# Patient Record
Sex: MALE | Race: WHITE | NOT HISPANIC OR LATINO | Employment: FULL TIME | URBAN - METROPOLITAN AREA
[De-identification: names, ages, dates, MRNs, and addresses within clinical notes are randomized per-mention and may not be internally consistent; named-entity substitution may affect disease eponyms.]

---

## 2018-02-09 ENCOUNTER — APPOINTMENT (OUTPATIENT)
Dept: RADIOLOGY | Facility: MEDICAL CENTER | Age: 23
End: 2018-02-09
Attending: EMERGENCY MEDICINE
Payer: COMMERCIAL

## 2018-02-09 ENCOUNTER — HOSPITAL ENCOUNTER (EMERGENCY)
Facility: MEDICAL CENTER | Age: 23
End: 2018-02-09
Attending: EMERGENCY MEDICINE
Payer: COMMERCIAL

## 2018-02-09 VITALS
TEMPERATURE: 98.3 F | RESPIRATION RATE: 18 BRPM | HEART RATE: 82 BPM | OXYGEN SATURATION: 99 % | WEIGHT: 152.12 LBS | HEIGHT: 71 IN | BODY MASS INDEX: 21.3 KG/M2 | DIASTOLIC BLOOD PRESSURE: 78 MMHG | SYSTOLIC BLOOD PRESSURE: 120 MMHG

## 2018-02-09 DIAGNOSIS — L72.3 SCROTAL SEBACEOUS CYST: ICD-10-CM

## 2018-02-09 LAB
APPEARANCE UR: CLEAR
BACTERIA #/AREA URNS HPF: ABNORMAL /HPF
BILIRUB UR QL STRIP.AUTO: NEGATIVE
COLOR UR: YELLOW
GLUCOSE UR STRIP.AUTO-MCNC: NEGATIVE MG/DL
KETONES UR STRIP.AUTO-MCNC: NEGATIVE MG/DL
LEUKOCYTE ESTERASE UR QL STRIP.AUTO: NEGATIVE
MICRO URNS: ABNORMAL
NITRITE UR QL STRIP.AUTO: NEGATIVE
PH UR STRIP.AUTO: 6 [PH]
PROT UR QL STRIP: NEGATIVE MG/DL
RBC # URNS HPF: ABNORMAL /HPF
RBC UR QL AUTO: ABNORMAL
SP GR UR STRIP.AUTO: 1.02
WBC #/AREA URNS HPF: ABNORMAL /HPF

## 2018-02-09 PROCEDURE — 81001 URINALYSIS AUTO W/SCOPE: CPT

## 2018-02-09 PROCEDURE — 76870 US EXAM SCROTUM: CPT

## 2018-02-09 PROCEDURE — 87491 CHLMYD TRACH DNA AMP PROBE: CPT

## 2018-02-09 PROCEDURE — 99284 EMERGENCY DEPT VISIT MOD MDM: CPT

## 2018-02-09 PROCEDURE — 87591 N.GONORRHOEAE DNA AMP PROB: CPT

## 2018-02-09 PROCEDURE — 87086 URINE CULTURE/COLONY COUNT: CPT

## 2018-02-09 ASSESSMENT — PAIN SCALES - GENERAL: PAINLEVEL_OUTOF10: 6

## 2018-02-09 NOTE — ED PROVIDER NOTES
"ED Provider Note    CHIEF COMPLAINT  As above    HPI  Дмитрий Alarcon is a 22 y.o. male who presents with a painful nodule on the scrotum. He reports he has had a nodule for quite a while, but over the last 2 days it's been causing him more discomfort and seems bigger than it has been in the past. Dull pain worse when he touches the area. No fevers or chills. No drainage. He's never seen a doctor for this nodule in the past. Denies dysuria hematuria or frequency.    REVIEW OF SYSTEMS  See HPI for further details. All other systems are negative.     PAST MEDICAL HISTORY  Denies medical problems    FAMILY HISTORY  History reviewed. No pertinent family history.    SOCIAL HISTORY  Social History   Substance Use Topics   • Smoking status: Current Every Day Smoker     Packs/day: 0.50     Types: Cigarettes   • Smokeless tobacco: Never Used   • Alcohol use Yes      Comment: Frequently       SURGICAL HISTORY  History reviewed. No pertinent surgical history.    CURRENT MEDICATIONS  Home Medications     Reviewed by Pete Joseph (Pharmacy Tech) on 02/09/18 at 1056  Med List Status: Complete   Medication Last Dose Status        Patient Goyo Taking any Medications                       ALLERGIES  No Known Allergies    PHYSICAL EXAM  VITAL SIGNS: /83   Pulse (!) 110   Temp 36.8 °C (98.3 °F)   Resp 18   Ht 1.803 m (5' 11\") Comment: Stated  Wt 69 kg (152 lb 1.9 oz)   SpO2 99%   BMI 21.22 kg/m²   Constitutional : Nontoxic  HENT: Atraumatic head  Eyes: Grossly normal  Neck: Grossly normal  Lymphatic: No lymphadenopathy noted.   Cardiovascular: Normal heart rate at 90  Thorax & Lungs: Normal breath sounds, No respiratory distress  Abdomen: Bowel sounds normal, Soft, No tenderness, No masses, No pulsatile masses, no hernias noted  Genitalia: Circumcised male. No discharge at the meatus. There is an obvious skin lesion over the right cephalad anterior scrotum this is about 1 cm in diameter bulbous ballotable " apparent scrotal lesion. Just inferior to this there is a smaller similarly appearing skin lesion. There is no surrounding erythema. He does have small scars from apparent previous pustules. The testicles themselves are nontender without swelling. Normal cremaster reflex.  Skin: Scrotal exam as above  Extremities: Normal inspection   Psychiatric: Affect normal        RADIOLOGY/PROCEDURES  [id number]   Final Result      2 cm mass at base of penis is distinct from the normal testes. Tissue diagnosis is recommended. Malignancy is a concern.        Imaging is interpreted by radiologist and reviewed by me    Labs:  Results for orders placed or performed during the hospital encounter of 02/09/18   URINALYSIS   Result Value Ref Range    Color Yellow     Character Clear     Specific Gravity 1.020 <1.035    Ph 6.0 5.0 - 8.0    Glucose Negative Negative mg/dL    Ketones Negative Negative mg/dL    Protein Negative Negative mg/dL    Bilirubin Negative Negative    Nitrite Negative Negative    Leukocyte Esterase Negative Negative    Occult Blood Trace (A) Negative    Micro Urine Req Microscopic    CHLAMYDIA & GC BY PCR   Result Value Ref Range    Source Urine    URINE MICROSCOPIC (W/UA)   Result Value Ref Range    WBC Rare (A) /hpf    RBC 0-2 (A) /hpf    Bacteria Rare (A) None /hpf       COURSE & MEDICAL DECISION MAKING  History presents to the ER with a scrotal mass. Appears most consistent with a sebaceous cyst. There is no evidence of infection. I did obtain an ultrasound showed that this scrotal mass was indeed distinct from the testicle. He certainly needs to follow-up with neurology to have this excised. I have referred him to Dr. green who is on call. I've asked him to call today to make an appointment as soon as possible. He should return to the ER if he has any fevers, tracking redness, significant pain or concern.    Patient referred to primary provider for blood pressure management    FINAL IMPRESSION  1.  Scrotal mass, probable sebaceous cyst    This dictation was created using voice recognition software. The accuracy of the dictation is limited to the abilities of the software. I expect there may be some errors of grammar and possibly content. The nursing notes were reviewed and certain aspects of this information were incorporated into this note.      Electronically signed by: Roger Schwartz, 2/9/2018 10:57 AM

## 2018-02-09 NOTE — ED NOTES
ERP at bedside. Pt agrees with plan of care discussed by ERP. AIDET acknowledged with patient. Lizbeth in low position, side rail up for pt safety. Call light within reach. Will continue to monitor.

## 2018-02-09 NOTE — DISCHARGE INSTRUCTIONS
Epidermal Cyst  An epidermal cyst is usually a small, painless lump under the skin. Cysts often occur on the face, neck, stomach, chest, or genitals. The cyst may be filled with a bad smelling paste. Do not pop your cyst. Popping the cyst can cause pain and puffiness (swelling).  HOME CARE   · Only take medicines as told by your doctor.  · Take your medicine (antibiotics) as told. Finish it even if you start to feel better.  GET HELP RIGHT AWAY IF:  · Your cyst is tender, red, or puffy.  · You are not getting better, or you are getting worse.  · You have any questions or concerns.  MAKE SURE YOU:  · Understand these instructions.  · Will watch your condition.  · Will get help right away if you are not doing well or get worse.     This information is not intended to replace advice given to you by your health care provider. Make sure you discuss any questions you have with your health care provider.     Document Released: 01/25/2006 Document Revised: 06/18/2013 Document Reviewed: 06/25/2012  [employer] Interactive Patient Education ©2016 [facility]

## 2018-02-10 ENCOUNTER — HOSPITAL ENCOUNTER (EMERGENCY)
Facility: MEDICAL CENTER | Age: 23
End: 2018-02-10
Attending: EMERGENCY MEDICINE
Payer: COMMERCIAL

## 2018-02-10 VITALS
RESPIRATION RATE: 16 BRPM | WEIGHT: 151.68 LBS | HEART RATE: 108 BPM | TEMPERATURE: 98 F | BODY MASS INDEX: 21.23 KG/M2 | SYSTOLIC BLOOD PRESSURE: 124 MMHG | OXYGEN SATURATION: 95 % | HEIGHT: 71 IN | DIASTOLIC BLOOD PRESSURE: 74 MMHG

## 2018-02-10 DIAGNOSIS — N49.2 SCROTAL WALL ABSCESS: ICD-10-CM

## 2018-02-10 LAB
C TRACH DNA SPEC QL NAA+PROBE: NEGATIVE
N GONORRHOEA DNA SPEC QL NAA+PROBE: NEGATIVE
SPECIMEN SOURCE: NORMAL

## 2018-02-10 PROCEDURE — 303977 HCHG I & D

## 2018-02-10 PROCEDURE — 99283 EMERGENCY DEPT VISIT LOW MDM: CPT

## 2018-02-10 RX ORDER — CEPHALEXIN 500 MG/1
500 TABLET ORAL 4 TIMES DAILY
Qty: 40 TAB | Refills: 0 | Status: SHIPPED | OUTPATIENT
Start: 2018-02-10

## 2018-02-10 RX ORDER — SULFAMETHOXAZOLE AND TRIMETHOPRIM 800; 160 MG/1; MG/1
1 TABLET ORAL 2 TIMES DAILY
Qty: 20 TAB | Refills: 0 | Status: SHIPPED | OUTPATIENT
Start: 2018-02-10 | End: 2018-02-20

## 2018-02-10 ASSESSMENT — PAIN SCALES - GENERAL
PAINLEVEL_OUTOF10: 0
PAINLEVEL_OUTOF10: 10

## 2018-02-10 NOTE — ED TRIAGE NOTES
Amb to triage w/ c/o R testicle pain.  Pt was seen at [facility] yesterday and diagnosed w/ a scrotal sebaceous cyst, scheduled for surgery 3/1.  Pt reports that cyst ruptures 20 min ago, reports + blood and purulent drainage.

## 2018-02-10 NOTE — DISCHARGE INSTRUCTIONS
Use Tylenol and Motrin for pain, complete the antibiotics as prescribed. Keep your follow-up appointment with the urologist. Return here if you develop new or worsening symptoms.

## 2018-02-10 NOTE — ED NOTES
Patient given discharge instructions, prescriptions x 2, follow up recommendations, and return precautions. All questions answered.

## 2018-02-11 LAB
BACTERIA UR CULT: NORMAL
SIGNIFICANT IND 70042: NORMAL
SITE SITE: NORMAL
SOURCE SOURCE: NORMAL

## 2018-02-11 NOTE — ED PROVIDER NOTES
"ED Provider Note    CHIEF COMPLAINT  Chief Complaint   Patient presents with   • Testicle Pain   • Cyst       HPI  Дмитрий Alarcon is a 22 y.o. male who presents to the emergency department complaining of a cyst on the scrotum. The patient has had a lump on the scrotum for at least 2 years now initially it was not really painful but over the last for 5 days it has become uncomfortable he was seen yesterday at [facility] emergency department and had an ultrasound and thought to have a sebaceous cyst of the scrotum and the patient was directed to follow-up with urology and the patient has an appointment with urology on March 1. Last night the mass got bigger and more painful and during the night it spontaneously ruptured and drained a large amount of pus.    REVIEW OF SYSTEMS no fever or chills no abdominal pain no nausea vomiting. All other systems negative    PAST MEDICAL HISTORY  No past medical history on file.    FAMILY HISTORY  No family history on file.    SOCIAL HISTORY  Social History     Social History   • Marital status: Single     Spouse name: N/A   • Number of children: N/A   • Years of education: N/A     Social History Main Topics   • Smoking status: Current Every Day Smoker     Packs/day: 0.50     Types: Cigarettes   • Smokeless tobacco: Never Used   • Alcohol use Yes      Comment: Frequently   • Drug use: No   • Sexual activity: Not on file     Other Topics Concern   • Not on file     Social History Narrative   • No narrative on file       SURGICAL HISTORY  No past surgical history on file.    CURRENT MEDICATIONS  Home Medications    **Home medications have not yet been reviewed for this encounter**         ALLERGIES  No Known Allergies    PHYSICAL EXAM  VITAL SIGNS: /74   Pulse (!) 108   Temp 36.7 °C (98 °F) (Temporal)   Resp 16   Ht 1.803 m (5' 11\")   Wt 68.8 kg (151 lb 10.8 oz)   SpO2 95%   BMI 21.15 kg/m²    Oxygen saturation is interpreted as adequate  Constitutional: Awake " verbal anxious but otherwise well-appearing individual in no distress  Cardiovascular: Regular minimal tachycardia  Lungs: Clear and equal no difficulty breathing  Abdomen/Back: Soft nontender nondistended. Examination of the scrotum shows that there is a 1.5 cm diameter area of swelling adjacent to a firm nodule and there is spontaneous drainage of some blood and pus it looks like an abscess that has spontaneously ruptured. The defect in the skin is about 1 cm across. I don't see any other scrotal abnormality swelling or evidence of cellulitis  Skin: Otherwise warm and dry  Musculoskeletal: No acute bony deformity  Neurologic: Awake verbal ambulatory    CHART REVIEW  I reviewed the note from [place] yesterday as summarized above    PROCEDURES  The area about the apparent abscess was locally infiltrated with lidocaine to achieve adequate anesthesia I was then able to clean out the abscess cavity and we can get no further pus out of it now the opening is 1 cm in length I don't think I need to open the area any wider.    MEDICAL DECISION MAKING and DISPOSITION  At this point in time it looks like the patient had an abscess which has ruptured and drained I'm going to place him on Flexeril and Bactrim and he is to wash the area with soap and water daily and keep it very clean he is to follow up with urology as planned and he is to return here if he is having new or worsening symptoms    IMPRESSION  1. Spontaneous rupture of scrotal abscess with history of scrotal mass    Electronically signed by: Luis Alfredo Marte, 2/10/2018 4:49 PM

## 2024-06-22 ENCOUNTER — HOSPITAL ENCOUNTER (EMERGENCY)
Facility: MEDICAL CENTER | Age: 29
End: 2024-06-22
Attending: EMERGENCY MEDICINE
Payer: COMMERCIAL

## 2024-06-22 ENCOUNTER — APPOINTMENT (OUTPATIENT)
Dept: RADIOLOGY | Facility: MEDICAL CENTER | Age: 29
End: 2024-06-22
Attending: EMERGENCY MEDICINE
Payer: COMMERCIAL

## 2024-06-22 ENCOUNTER — PHARMACY VISIT (OUTPATIENT)
Dept: PHARMACY | Facility: MEDICAL CENTER | Age: 29
End: 2024-06-22
Payer: COMMERCIAL

## 2024-06-22 VITALS
WEIGHT: 169.75 LBS | HEART RATE: 66 BPM | SYSTOLIC BLOOD PRESSURE: 140 MMHG | RESPIRATION RATE: 16 BRPM | TEMPERATURE: 98.2 F | HEIGHT: 70 IN | BODY MASS INDEX: 24.3 KG/M2 | OXYGEN SATURATION: 98 % | DIASTOLIC BLOOD PRESSURE: 74 MMHG

## 2024-06-22 DIAGNOSIS — N50.811 PAIN IN BOTH TESTICLES: ICD-10-CM

## 2024-06-22 DIAGNOSIS — N50.812 PAIN IN BOTH TESTICLES: ICD-10-CM

## 2024-06-22 DIAGNOSIS — R10.30 LOWER ABDOMINAL PAIN: ICD-10-CM

## 2024-06-22 LAB
ALBUMIN SERPL BCP-MCNC: 4.6 G/DL (ref 3.2–4.9)
ALBUMIN/GLOB SERPL: 1.5 G/DL
ALP SERPL-CCNC: 72 U/L (ref 30–99)
ALT SERPL-CCNC: 79 U/L (ref 2–50)
ANION GAP SERPL CALC-SCNC: 12 MMOL/L (ref 7–16)
APPEARANCE UR: CLEAR
AST SERPL-CCNC: 36 U/L (ref 12–45)
BASOPHILS # BLD AUTO: 0.6 % (ref 0–1.8)
BASOPHILS # BLD: 0.07 K/UL (ref 0–0.12)
BILIRUB SERPL-MCNC: 0.4 MG/DL (ref 0.1–1.5)
BILIRUB UR QL STRIP.AUTO: NEGATIVE
BUN SERPL-MCNC: 11 MG/DL (ref 8–22)
CALCIUM ALBUM COR SERPL-MCNC: 9 MG/DL (ref 8.5–10.5)
CALCIUM SERPL-MCNC: 9.5 MG/DL (ref 8.5–10.5)
CHLORIDE SERPL-SCNC: 103 MMOL/L (ref 96–112)
CO2 SERPL-SCNC: 22 MMOL/L (ref 20–33)
COLOR UR: YELLOW
CREAT SERPL-MCNC: 0.75 MG/DL (ref 0.5–1.4)
EOSINOPHIL # BLD AUTO: 0.09 K/UL (ref 0–0.51)
EOSINOPHIL NFR BLD: 0.8 % (ref 0–6.9)
ERYTHROCYTE [DISTWIDTH] IN BLOOD BY AUTOMATED COUNT: 40.1 FL (ref 35.9–50)
GFR SERPLBLD CREATININE-BSD FMLA CKD-EPI: 126 ML/MIN/1.73 M 2
GLOBULIN SER CALC-MCNC: 3 G/DL (ref 1.9–3.5)
GLUCOSE SERPL-MCNC: 94 MG/DL (ref 65–99)
GLUCOSE UR STRIP.AUTO-MCNC: NEGATIVE MG/DL
HCT VFR BLD AUTO: 48.9 % (ref 42–52)
HGB BLD-MCNC: 16.9 G/DL (ref 14–18)
IMM GRANULOCYTES # BLD AUTO: 0.14 K/UL (ref 0–0.11)
IMM GRANULOCYTES NFR BLD AUTO: 1.2 % (ref 0–0.9)
KETONES UR STRIP.AUTO-MCNC: NEGATIVE MG/DL
LACTATE SERPL-SCNC: 0.9 MMOL/L (ref 0.5–2)
LEUKOCYTE ESTERASE UR QL STRIP.AUTO: NEGATIVE
LIPASE SERPL-CCNC: 23 U/L (ref 11–82)
LYMPHOCYTES # BLD AUTO: 3.09 K/UL (ref 1–4.8)
LYMPHOCYTES NFR BLD: 25.8 % (ref 22–41)
MCH RBC QN AUTO: 31.9 PG (ref 27–33)
MCHC RBC AUTO-ENTMCNC: 34.6 G/DL (ref 32.3–36.5)
MCV RBC AUTO: 92.4 FL (ref 81.4–97.8)
MICRO URNS: NORMAL
MONOCYTES # BLD AUTO: 1.04 K/UL (ref 0–0.85)
MONOCYTES NFR BLD AUTO: 8.7 % (ref 0–13.4)
NEUTROPHILS # BLD AUTO: 7.55 K/UL (ref 1.82–7.42)
NEUTROPHILS NFR BLD: 62.9 % (ref 44–72)
NITRITE UR QL STRIP.AUTO: NEGATIVE
NRBC # BLD AUTO: 0 K/UL
NRBC BLD-RTO: 0 /100 WBC (ref 0–0.2)
PH UR STRIP.AUTO: 5 [PH] (ref 5–8)
PLATELET # BLD AUTO: 301 K/UL (ref 164–446)
PMV BLD AUTO: 9.6 FL (ref 9–12.9)
POTASSIUM SERPL-SCNC: 4.3 MMOL/L (ref 3.6–5.5)
PROT SERPL-MCNC: 7.6 G/DL (ref 6–8.2)
PROT UR QL STRIP: NEGATIVE MG/DL
RBC # BLD AUTO: 5.29 M/UL (ref 4.7–6.1)
RBC UR QL AUTO: NEGATIVE
SODIUM SERPL-SCNC: 137 MMOL/L (ref 135–145)
SP GR UR STRIP.AUTO: 1.03
UROBILINOGEN UR STRIP.AUTO-MCNC: 0.2 MG/DL
WBC # BLD AUTO: 12 K/UL (ref 4.8–10.8)

## 2024-06-22 PROCEDURE — 99285 EMERGENCY DEPT VISIT HI MDM: CPT

## 2024-06-22 PROCEDURE — 83690 ASSAY OF LIPASE: CPT

## 2024-06-22 PROCEDURE — 700102 HCHG RX REV CODE 250 W/ 637 OVERRIDE(OP): Performed by: EMERGENCY MEDICINE

## 2024-06-22 PROCEDURE — 74177 CT ABD & PELVIS W/CONTRAST: CPT

## 2024-06-22 PROCEDURE — 80053 COMPREHEN METABOLIC PANEL: CPT

## 2024-06-22 PROCEDURE — 36415 COLL VENOUS BLD VENIPUNCTURE: CPT

## 2024-06-22 PROCEDURE — 700111 HCHG RX REV CODE 636 W/ 250 OVERRIDE (IP): Performed by: EMERGENCY MEDICINE

## 2024-06-22 PROCEDURE — 85025 COMPLETE CBC W/AUTO DIFF WBC: CPT

## 2024-06-22 PROCEDURE — 700105 HCHG RX REV CODE 258: Performed by: EMERGENCY MEDICINE

## 2024-06-22 PROCEDURE — A9270 NON-COVERED ITEM OR SERVICE: HCPCS | Performed by: EMERGENCY MEDICINE

## 2024-06-22 PROCEDURE — 76870 US EXAM SCROTUM: CPT

## 2024-06-22 PROCEDURE — 81003 URINALYSIS AUTO W/O SCOPE: CPT

## 2024-06-22 PROCEDURE — 700117 HCHG RX CONTRAST REV CODE 255: Performed by: EMERGENCY MEDICINE

## 2024-06-22 PROCEDURE — RXMED WILLOW AMBULATORY MEDICATION CHARGE: Performed by: EMERGENCY MEDICINE

## 2024-06-22 PROCEDURE — 96374 THER/PROPH/DIAG INJ IV PUSH: CPT

## 2024-06-22 PROCEDURE — 83605 ASSAY OF LACTIC ACID: CPT

## 2024-06-22 RX ORDER — HYDROCODONE BITARTRATE AND ACETAMINOPHEN 5; 325 MG/1; MG/1
2 TABLET ORAL ONCE
Status: COMPLETED | OUTPATIENT
Start: 2024-06-22 | End: 2024-06-22

## 2024-06-22 RX ORDER — SODIUM CHLORIDE 9 MG/ML
1000 INJECTION, SOLUTION INTRAVENOUS ONCE
Status: COMPLETED | OUTPATIENT
Start: 2024-06-22 | End: 2024-06-22

## 2024-06-22 RX ORDER — HYDROCODONE BITARTRATE AND ACETAMINOPHEN 5; 325 MG/1; MG/1
1 TABLET ORAL EVERY 4 HOURS PRN
Qty: 12 TABLET | Refills: 0 | Status: SHIPPED | OUTPATIENT
Start: 2024-06-22 | End: 2024-06-25

## 2024-06-22 RX ORDER — MORPHINE SULFATE 4 MG/ML
4 INJECTION INTRAVENOUS
Status: DISCONTINUED | OUTPATIENT
Start: 2024-06-22 | End: 2024-06-22 | Stop reason: HOSPADM

## 2024-06-22 RX ADMIN — MORPHINE SULFATE 4 MG: 4 INJECTION INTRAVENOUS at 16:33

## 2024-06-22 RX ADMIN — IOHEXOL 100 ML: 350 INJECTION, SOLUTION INTRAVENOUS at 17:32

## 2024-06-22 RX ADMIN — SODIUM CHLORIDE 1000 ML: 9 INJECTION, SOLUTION INTRAVENOUS at 16:23

## 2024-06-22 RX ADMIN — HYDROCODONE BITARTRATE AND ACETAMINOPHEN 2 TABLET: 5; 325 TABLET ORAL at 18:11

## 2024-06-22 ASSESSMENT — PAIN DESCRIPTION - PAIN TYPE: TYPE: ACUTE PAIN

## 2024-06-22 NOTE — ED TRIAGE NOTES
"Chief Complaint   Patient presents with    Abdominal Pain     Began a couple weeks ago, lower mid pain 7/10 pressure pain, no relief with OTC, last Bm this AM normal     Testicle Pain     Began the same, pt states redness, hx urinary issues, no urinary pain, worse with sitting         Ambulated to triage with partner for above complaint.    ABD pain protocols ordered. Pt brought to [facility] office for blood draw. UA given. Pt educated of triage process and informed to contact staff if situation changes.    BP (!) 125/96   Pulse 83   Temp 37.2 °C (98.9 °F) (Temporal)   Resp 18   Ht 1.778 m (5' 10\")   Wt 77 kg (169 lb 12.1 oz)   SpO2 99%   BMI 24.36 kg/m²      "

## 2024-06-22 NOTE — ED PROVIDER NOTES
"ED Provider Note  CHIEF COMPLAINT  Chief Complaint   Patient presents with    Abdominal Pain     Began a couple weeks ago, lower mid pain 7/10 pressure pain, no relief with OTC, last Bm this AM normal     Testicle Pain     Began the same, pt states redness, hx urinary issues, no urinary pain, worse with sitting        HPI  Дмитрий Alarcon is a 28 y.o. male who presents for evaluation of abdominal pain and testicle pain a couple of \"weeks\".  Patient notes that symptoms started in the left inguinal region and spread to the testicle and eventually both sides of his lower abdomen.  He notes normal bowel movement this morning and no diarrhea.  He has no nausea or vomiting but states the pain has been constant for several weeks and generally worsening.  He notes no history of similar pain but has had a \"cyst\" in his scrotum diagnosed before.  EXTERNAL RECORDS REVIEWED    ROS  Constitutional: No fevers or chills  Skin: No rashes  HEENT: No sore throat, or runny nose  Pulm: No shortness of breath, cough, wheezing, stridor, or pain with inspiration/expiration  Gastrointestinal: No nausea, vomiting, diarrhea, constipation, bloating, melena, or hematochezia  Genitourinary: No dysuria or hematuria.  Left testicular pain.  Musculoskeletal: No pain, swelling, or focal weakness  Neurologic: No sensory or focal motor changes to extremities. No confusion or disorientation.  Immuno: No hx of recurrent infections        LIMITATION TO HISTORY   none  OUTSIDE HISTORIAN(S):  none        PAST FAM HISTORY  History reviewed. No pertinent family history.    PAST MEDICAL HISTORY   Scrotal cyst    SOCIAL HISTORY  Social History     Tobacco Use    Smoking status: Former     Current packs/day: 0.50     Types: Cigarettes    Smokeless tobacco: Never   Vaping Use    Vaping status: Never Used   Substance and Sexual Activity    Alcohol use: Not Currently     Comment: Frequently    Drug use: No    Sexual activity: Not on file       SURGICAL " "HISTORY  patient denies any surgical history    CURRENT MEDICATIONS  Home Medications       Reviewed by Delores Calvert R.N. (Registered Nurse) on 06/22/24 at 1408  Med List Status: Partial     Medication Last Dose Status   Cephalexin 500 MG Tab  Active                  Audit from Redirected Encounters    **Home medications have not yet been reviewed for this encounter**          ALLERGIES  No Known Allergies    PHYSICAL EXAM  VITAL SIGNS: /76   Pulse 66   Temp 37.2 °C (98.9 °F) (Temporal)   Resp 18   Ht 1.778 m (5' 10\")   Wt 77 kg (169 lb 12.1 oz)   SpO2 98%   BMI 24.36 kg/m²    Gen: Alert in no apparent distress.  HEENT: No signs of trauma, Bilateral external ears normal, Nose normal. Conjunctiva normal, Non-icteric.   Cardiovascular: Regular rate and rhythm, no murmurs.  Capillary refill less than 3 seconds to all extremities, 2+ distal pulses.  Thorax & Lungs: Normal breath sounds, No respiratory distress, No wheezing bilateral chest rise  Abdomen: Bowel sounds normal, Soft, diffuse lower abdominal tenderness moderate no masses, No pulsatile masses. No Guarding or rebound  : No penile or scrotal lesions.  No erythema or induration to scrotum.  Normal lie to both testicles.  Mild posterior tenderness on the left.  No perineal erythema.  Bilateral cremasteric reflex intact.  Skin: Warm, Dry, No erythema, No rash noted to exposed areas.   Back: No bony tenderness, No CVA tenderness.   Extremities: Intact distal pulses, No edema  Neurologic: Alert , no facial droop, grossly normal coordination and strength  Psychiatric: Affect pleasant    INITIAL IMPRESSION  Patient arrives for evaluation of symptoms that could be related to a hernia or possibly small bowel obstruction however the chronicity of the problem would argue against SBO.  It is possible atypical appendicitis or diverticulitis could be an issue alternatively, ureteral stone is possible 2.  He does not have any scrotal swelling or abnormal " testicular lie arguing against torsion but it is possible we may need to proceed with ultrasound if the imaging of his abdomen pelvis is nondiagnostic.  Currently, the patient is in fair amount of pain and will be treated with morphine and IV fluids while we await imaging.  He states understanding of this.    ED observation? No    LABS  Results for orders placed or performed during the hospital encounter of 06/22/24   CBC WITH DIFFERENTIAL   Result Value Ref Range    WBC 12.0 (H) 4.8 - 10.8 K/uL    RBC 5.29 4.70 - 6.10 M/uL    Hemoglobin 16.9 14.0 - 18.0 g/dL    Hematocrit 48.9 42.0 - 52.0 %    MCV 92.4 81.4 - 97.8 fL    MCH 31.9 27.0 - 33.0 pg    MCHC 34.6 32.3 - 36.5 g/dL    RDW 40.1 35.9 - 50.0 fL    Platelet Count 301 164 - 446 K/uL    MPV 9.6 9.0 - 12.9 fL    Neutrophils-Polys 62.90 44.00 - 72.00 %    Lymphocytes 25.80 22.00 - 41.00 %    Monocytes 8.70 0.00 - 13.40 %    Eosinophils 0.80 0.00 - 6.90 %    Basophils 0.60 0.00 - 1.80 %    Immature Granulocytes 1.20 (H) 0.00 - 0.90 %    Nucleated RBC 0.00 0.00 - 0.20 /100 WBC    Neutrophils (Absolute) 7.55 (H) 1.82 - 7.42 K/uL    Lymphs (Absolute) 3.09 1.00 - 4.80 K/uL    Monos (Absolute) 1.04 (H) 0.00 - 0.85 K/uL    Eos (Absolute) 0.09 0.00 - 0.51 K/uL    Baso (Absolute) 0.07 0.00 - 0.12 K/uL    Immature Granulocytes (abs) 0.14 (H) 0.00 - 0.11 K/uL    NRBC (Absolute) 0.00 K/uL   COMP METABOLIC PANEL   Result Value Ref Range    Sodium 137 135 - 145 mmol/L    Potassium 4.3 3.6 - 5.5 mmol/L    Chloride 103 96 - 112 mmol/L    Co2 22 20 - 33 mmol/L    Anion Gap 12.0 7.0 - 16.0    Glucose 94 65 - 99 mg/dL    Bun 11 8 - 22 mg/dL    Creatinine 0.75 0.50 - 1.40 mg/dL    Calcium 9.5 8.5 - 10.5 mg/dL    Correct Calcium 9.0 8.5 - 10.5 mg/dL    AST(SGOT) 36 12 - 45 U/L    ALT(SGPT) 79 (H) 2 - 50 U/L    Alkaline Phosphatase 72 30 - 99 U/L    Total Bilirubin 0.4 0.1 - 1.5 mg/dL    Albumin 4.6 3.2 - 4.9 g/dL    Total Protein 7.6 6.0 - 8.2 g/dL    Globulin 3.0 1.9 - 3.5 g/dL    A-G  Ratio 1.5 g/dL   LIPASE   Result Value Ref Range    Lipase 23 11 - 82 U/L   URINALYSIS    Specimen: Urine   Result Value Ref Range    Color Yellow     Character Clear     Specific Gravity 1.027 <1.035    Ph 5.0 5.0 - 8.0    Glucose Negative Negative mg/dL    Ketones Negative Negative mg/dL    Protein Negative Negative mg/dL    Bilirubin Negative Negative    Urobilinogen, Urine 0.2 Negative    Nitrite Negative Negative    Leukocyte Esterase Negative Negative    Occult Blood Negative Negative    Micro Urine Req see below    ESTIMATED GFR   Result Value Ref Range    GFR (CKD-EPI) 126 >60 mL/min/1.73 m 2   LACTIC ACID   Result Value Ref Range    Lactic Acid 0.9 0.5 - 2.0 mmol/L       RADIOLOGY  [id number]   Final Result      1.  No evidence of testicular mass or torsion.      2.  Small hydrocele noted bilaterally.      CT-ABDOMEN-PELVIS WITH   Final Result      1.  No acute abnormalities are identified in the abdomen or pelvis.            ASSESSMENT, COURSE AND PLAN  Care Narrative: Patient's imaging and laboratory evaluation were generally reassuring although there were small hydroceles noted bilaterally.  As there is no evidence for an emergent problem, patient will need follow-up with an outpatient provider for further evaluation if symptoms persist.  The source is unclear but unlikely to be related to the hydroceles.  There are no findings to suggest need for antibiotics but patient still notes that he has discomfort and will be prescribed pain medication, albeit a short prescription.  He will be referred to urology and will be referred to the [facility] referral service for PCP as well.  I suspect that he will need an MRI at some point of his abdomen and pelvis if symptoms persist.  He states understanding that if symptoms worsen or change in any way he needs to return for reevaluation.            ADDITIONAL PROBLEMS MANAGED  none        I have discussed management of the patient with the following physicians  and JAI's:  none    Escalation of care considered, and ultimately not performed: none    Barriers to care at this time, including but not limited to: . none    Decision tools and Rx drugs considered including, but not limited to : none    Discussion of management with other QHP or appropriate source(s): none    The patient will not drink alcohol nor drive with prescribed medications. The patient will return for worsening symptoms and is stable at the time of discharge. The patient verbalizes understanding and will comply.    FINAL IMPRESSION  1. Pain in both testicles    2. Lower abdominal pain        Electronically signed by: Ignacio Koch M.D., 6/22/2024 4:00 PM

## 2024-06-23 NOTE — ED NOTES
Discharge education provided by ERP. Discharge paperwork reviewed with patient. PIV removed; signed and understands controlled substance agreement. Prescription to be picked up by patient. All questions answered. All belongings with patient. Patient ambulated to lobby unassisted with steady gait.

## 2024-06-27 ENCOUNTER — OFFICE VISIT (OUTPATIENT)
Dept: UROLOGY | Facility: MEDICAL CENTER | Age: 29
End: 2024-06-27
Payer: COMMERCIAL

## 2024-06-27 VITALS
SYSTOLIC BLOOD PRESSURE: 120 MMHG | HEART RATE: 84 BPM | DIASTOLIC BLOOD PRESSURE: 80 MMHG | OXYGEN SATURATION: 96 % | TEMPERATURE: 98.7 F

## 2024-06-27 DIAGNOSIS — K40.90 NON-RECURRENT UNILATERAL INGUINAL HERNIA WITHOUT OBSTRUCTION OR GANGRENE: ICD-10-CM

## 2024-06-27 LAB
POC POST-VOID: 69 ML
POC PRE-VOID: NORMAL

## 2024-06-27 RX ORDER — TAMSULOSIN HYDROCHLORIDE 0.4 MG/1
0.4 CAPSULE ORAL
Qty: 90 CAPSULE | Refills: 3 | Status: SHIPPED | OUTPATIENT
Start: 2024-06-27 | End: 2025-06-22

## 2024-06-27 NOTE — PROGRESS NOTES
Subjective  Дмитрий Alarcon is a 28 y.o. male who presents today for evaluation of left sided groin/scrotal pain. The pain started a little over a month ago after lifting something heavy. It is worsened with heavy lifting and hurts more while sitting. It is improved with standing. Sometime there will be some redness over the groin.    He has urinary frequency and urgency and feelings of incomplete bladder emptying. He will urinate multiple times an hour. No dysuria or hematuria. He has a soft bowel movement daily/every other day, no straining.     No family history on file.    Social History     Socioeconomic History    Marital status:      Spouse name: Not on file    Number of children: Not on file    Years of education: Not on file    Highest education level: Not on file   Occupational History    Not on file   Tobacco Use    Smoking status: Former     Current packs/day: 0.50     Types: Cigarettes    Smokeless tobacco: Never   Vaping Use    Vaping status: Never Used   Substance and Sexual Activity    Alcohol use: Not Currently     Comment: Frequently    Drug use: No    Sexual activity: Not on file   Other Topics Concern    Not on file   Social History Narrative    Not on file     Social Determinants of Health     Financial Resource Strain: Not on file   Food Insecurity: Not on file   Transportation Needs: Not on file   Physical Activity: Not on file   Stress: Not on file   Social Connections: Not on file   Intimate Partner Violence: Not on file   Housing Stability: Not on file       No past surgical history on file.    No past medical history on file.    Current Outpatient Medications   Medication Sig Dispense Refill    tamsulosin (FLOMAX) 0.4 MG capsule Take 1 Capsule by mouth 1/2 hour after breakfast for 360 days. 90 Capsule 3    Cephalexin 500 MG Tab Take 1 Tab by mouth 4 times a day. 40 Tab 0     No current facility-administered medications for this visit.       No Known Allergies    Objective  BP  120/80 (BP Location: Right arm, Patient Position: Sitting, BP Cuff Size: Adult)   Pulse 84   Temp 37.1 °C (98.7 °F) (Temporal)   SpO2 96%   Physical Exam  Constitutional:       Appearance: Normal appearance.   HENT:      Head: Normocephalic and atraumatic.   Pulmonary:      Effort: Pulmonary effort is normal.   Genitourinary:     Testes: Normal.      Comments: Tender left inguinal canal, inguinal hernia on exam  Skin:     General: Skin is warm and dry.   Neurological:      General: No focal deficit present.      Mental Status: He is alert.   Psychiatric:         Mood and Affect: Mood normal.         Behavior: Behavior normal.         Labs:   BMP   Lab Results   Component Value Date/Time    SODIUM 137 06/22/2024 1432    POTASSIUM 4.3 06/22/2024 1432    CHLORIDE 103 06/22/2024 1432    CO2 22 06/22/2024 1432    GLUCOSE 94 06/22/2024 1432    BUN 11 06/22/2024 1432    CREATININE 0.75 06/22/2024 1432    CALCIUM 9.5 06/22/2024 1432         Imaging:   US SCROTAL   [id number] 06/22/2024    Narrative  6/22/2024 6:10 PM    HISTORY/REASON FOR EXAM: Pain; Bilateral scrotal and testicular pain, worse on the left, for 1 month.. Bilateral scrotal pain    TECHNIQUE/EXAM DESCRIPTION:  Real-time sonography of the scrotum was performed with gray-scale, color and duplex Doppler imaging.    COMPARISON: 02/09/2018    FINDINGS:    The right testis measures 4.45 cm x 2.64 cm x 3.37 cm. Normal in size and echotexture. Normal vascularity on color Doppler. No intratesticular mass.    The left testis measures 4.39 cm x 2.54 cm x 2.90 cm. Normal in size and echotexture. Normal vascularity on color Doppler. No intratesticular mass.    Vascular flow is symmetric.    Appearance of the epididymides are within normal limits.    Small hydrocele noted bilaterally. There are some septations noted within the left-sided hydrocele.    No varicocele is detected.    Impression  1.  No evidence of testicular mass or torsion.    2.  Small hydrocele  noted bilaterally.    6/22/2024 5:20 PM     HISTORY/REASON FOR EXAM:  Bilateral abdominal pain and left testicular pain.        TECHNIQUE/EXAM DESCRIPTION:   CT scan of the abdomen and pelvis with contrast.     Contrast-enhanced helical scanning was obtained from the diaphragmatic domes through the pubic symphysis following the bolus administration of nonionic contrast without complication.     100 mL of Omnipaque 350 nonionic contrast was administered without complication.     Low dose optimization technique was utilized for this CT exam including automated exposure control and adjustment of the mA and/or kV according to patient size.     COMPARISON: No prior studies available.     FINDINGS:  Lower Chest: Unremarkable.     Liver: Normal.     Spleen: Unremarkable.     Pancreas: Unremarkable.     Gallbladder: No calcified stones.     Biliary: Nondilated.     Adrenal glands: Normal.     Kidneys: Unremarkable without hydronephrosis.     Bowel: No obstruction or acute inflammation.     Lymph nodes: No adenopathy.     Vasculature: Unremarkable.     Peritoneum: Unremarkable without ascites.     Musculoskeletal: No acute or destructive process.     Pelvis: No adenopathy or free fluid.           IMPRESSION:     1.  No acute abnormalities are identified in the abdomen or pelvis.    Assessment    For his left inguina/scrotal pain I recommend referral to general surgery for evaluation of a left inguinal hernia.  We discussed return precautions to the ED including severe worsening of pain, a bulge in the groin/scrotum that cannot be reduced, pain and no passage of flatus or bowel movement.    If the pain continues we could consider pelvic floor physical therapy or a series of cord block injections.     For his frequency, urgency, and feelings of incomplete bladder emptying I recommend starting Tamsulosin 0.4 mg daily. He will follow up in 3 months for re-evaluation.     Plan    Problem List Items Addressed This Visit     None  Visit Diagnoses       Non-recurrent unilateral inguinal hernia without obstruction or gangrene        Relevant Orders    Referral to General Surgery    POCT Bladder Scan (Completed)          RTC 3 months  Referral to general surgery for hernia  Start Tamsulosin 0.4 mg daily

## 2024-06-27 NOTE — PATIENT INSTRUCTIONS
Transcutaneous Tibial Nerve Stimulation     What is Transcutaneous Tibial Nerve Stimulation (TTNS)?     Transcutaneous Tibial Nerve Stimulation (TTNS) is a non-invasive therapy used to treat various medical conditions, including overactive bladder, urinary urgency, and some types of chronic pain. It involves the use of a TENS (Transcutaneous Electrical Nerve Stimulation) machine to stimulate the tibial nerve, which can help alleviate symptoms and improve muscle function.     Before You Begin:     Before starting TTNS therapy with a TENS machine, please ensure the following:     Consultation: You should have discussed this treatment with your healthcare provider, who will determine if TTNS is suitable for your condition.   TENS Machine: Ensure you have a TENS machine specifically designed for TTNS. Your healthcare provider or a medical supply store can help you acquire the appropriate device. We recommend the TENS Collisionable0 Digital TENS Unit with accessories (can be ordered from [employer]) or a similar device.   Clean Hands: Always wash your hands thoroughly before handling the TENS machine or its electrodes.   Using Your TENS Machine for TTNS:     Follow these steps to use your TENS machine for TTNS:     Prepare the TENS Machine:     a. Make sure the TENS machine is turned off before attaching electrodes.   b. Ensure the device is clean and in good working condition.     Electrode Placement:     a. Your healthcare provider will guide you on the precise placement of the electrodes. Typically, the first lead is placed between the medial malleolus (ankle bone on the inside of your leg) and your heel, and the second pad is placed 2 pad widths up behind your tibia (place directly above first pad). See diagram below.   b. Ensure your skin is clean and dry before attaching the electrodes.   c. Connect the electrodes to the TENS machine following the 's instructions.         Turn On the TENS Machine:     a. Start with  the TENS machine turned off.   b. Gradually increase the intensity to a comfortable level, as directed by your healthcare provider.  Pulse width setting of 200 ls, intensity of 22-28 mA, and a frequency of 10 Hz. You should feel a gentle, tingling sensation but no discomfort or pain.     Treatment Duration:     a. The duration and frequency of TTNS recommended is 30 minutes, once a week for 12 weeks. You can then transition to a maintenance schedule of once a month for 30 minutes. You can perform this more often if needed (as frequently as daily).     During Treatment:     a. Remain still and relaxed during the TTNS session.   b. You may read, watch TV, or engage in a quiet activity while using the TENS machine.     After Treatment:     a. Turn off the TENS machine when the session is complete.   b. Carefully remove the electrodes and clean the skin if needed.   c. Store the TENS machine and electrodes in a safe, dry place.     Important Considerations:     Safety: Do not use your TENS machine while bathing or showering.   Skin Sensitivity: If you experience skin irritation or discomfort, discontinue use and contact your healthcare provider.   Medication: Continue any medications as prescribed by your healthcare provider unless instructed otherwise.   Maintenance: Regularly check your TENS machine and electrodes for wear and tear. Replace them as needed.   Follow-Up: Keep all scheduled follow-up appointments with your healthcare provider to assess your progress and make any necessary adjustments to your treatment plan.     If you have any questions or concerns about your TTNS therapy or experience any unusual symptoms, contact your healthcare provider promptly.

## 2025-01-09 ENCOUNTER — TELEPHONE (OUTPATIENT)
Dept: HEALTH INFORMATION MANAGEMENT | Facility: OTHER | Age: 30
End: 2025-01-09
Payer: COMMERCIAL